# Patient Record
Sex: FEMALE | Race: WHITE | NOT HISPANIC OR LATINO
[De-identification: names, ages, dates, MRNs, and addresses within clinical notes are randomized per-mention and may not be internally consistent; named-entity substitution may affect disease eponyms.]

---

## 2023-06-20 ENCOUNTER — DASHBOARD ENCOUNTERS (OUTPATIENT)
Age: 28
End: 2023-06-20

## 2023-06-20 ENCOUNTER — OFFICE VISIT (OUTPATIENT)
Dept: URBAN - METROPOLITAN AREA CLINIC 92 | Facility: CLINIC | Age: 28
End: 2023-06-20
Payer: COMMERCIAL

## 2023-06-20 VITALS
WEIGHT: 164 LBS | TEMPERATURE: 97.1 F | HEART RATE: 75 BPM | DIASTOLIC BLOOD PRESSURE: 73 MMHG | BODY MASS INDEX: 25.74 KG/M2 | SYSTOLIC BLOOD PRESSURE: 118 MMHG | HEIGHT: 67 IN

## 2023-06-20 DIAGNOSIS — K58.0 IRRITABLE BOWEL SYNDROME WITH DIARRHEA: ICD-10-CM

## 2023-06-20 PROCEDURE — 99205 OFFICE O/P NEW HI 60 MIN: CPT | Performed by: INTERNAL MEDICINE

## 2023-06-20 RX ORDER — DICYCLOMINE HYDROCHLORIDE 10 MG/1
1-2 TABLETS CAPSULE ORAL
Qty: 120 | Refills: 11 | OUTPATIENT
Start: 2023-06-20 | End: 2024-06-14

## 2023-06-20 NOTE — HPI-TODAY'S VISIT:
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 Has seen Dr. Fitzpatrick in the past, and was dx with IBS-D and given rifaximin in the past. Prior colon with bx negative.  Notes gas, abdominal cramping, and monthly IBS-D flares (worse with stress diet - diarrhea, has to miss work). During a flare, has 3-4 watery BMs for 2-3 days. No blood in the stool, no unintentional weight loss. No fam hx CRC or IBD.   Has never tried Bentyl.

## 2023-06-22 ENCOUNTER — WEB ENCOUNTER (OUTPATIENT)
Dept: URBAN - METROPOLITAN AREA CLINIC 92 | Facility: CLINIC | Age: 28
End: 2023-06-22

## 2023-06-26 ENCOUNTER — TELEPHONE ENCOUNTER (OUTPATIENT)
Dept: URBAN - METROPOLITAN AREA CLINIC 92 | Facility: CLINIC | Age: 28
End: 2023-06-26

## 2023-06-26 RX ORDER — DICYCLOMINE HYDROCHLORIDE 10 MG/1
1-2 TABLETS CAPSULE ORAL
Qty: 120 | Refills: 11 | Status: ACTIVE | COMMUNITY
Start: 2023-06-20 | End: 2024-06-14

## 2023-07-13 ENCOUNTER — ERX REFILL RESPONSE (OUTPATIENT)
Dept: URBAN - METROPOLITAN AREA CLINIC 92 | Facility: CLINIC | Age: 28
End: 2023-07-13

## 2023-07-13 RX ORDER — DICYCLOMINE HYDROCHLORIDE 10 MG/1
TAKE 1-2 TABLETS BY MOUTH 4 TIMES A DAY AS NEEDED CAPSULE ORAL
Qty: 120 CAPSULE | Refills: 11 | OUTPATIENT

## 2023-07-13 RX ORDER — DICYCLOMINE HYDROCHLORIDE 10 MG/1
1-2 TABLETS CAPSULE ORAL
Qty: 120 | Refills: 11 | OUTPATIENT